# Patient Record
Sex: MALE | Race: WHITE
[De-identification: names, ages, dates, MRNs, and addresses within clinical notes are randomized per-mention and may not be internally consistent; named-entity substitution may affect disease eponyms.]

---

## 2018-10-21 ENCOUNTER — HOSPITAL ENCOUNTER (EMERGENCY)
Dept: HOSPITAL 33 - ED | Age: 28
Discharge: HOME | End: 2018-10-21
Payer: MEDICAID

## 2018-10-21 VITALS — OXYGEN SATURATION: 96 % | DIASTOLIC BLOOD PRESSURE: 84 MMHG | SYSTOLIC BLOOD PRESSURE: 132 MMHG | HEART RATE: 82 BPM

## 2018-10-21 DIAGNOSIS — Z72.0: ICD-10-CM

## 2018-10-21 DIAGNOSIS — K04.7: Primary | ICD-10-CM

## 2018-10-21 PROCEDURE — 99283 EMERGENCY DEPT VISIT LOW MDM: CPT

## 2018-10-21 RX ADMIN — CEPHALEXIN ONE MG: 500 CAPSULE ORAL at 05:02

## 2018-10-21 RX ADMIN — HYDROCODONE BITARTRATE AND ACETAMINOPHEN ONE TAB: 5; 325 TABLET ORAL at 04:53

## 2018-10-21 NOTE — ERPHSYRPT
- History of Present Illness


Time Seen by Provider: 10/21/18 04:14


Source: patient


Exam Limitations: no limitations


Patient Subjective Stated Complaint: pt co toothache and edema to left lower jaw

; pt states he cannot get into the dentist for 2 more weeks and this am woke up 

with severe pain and swelling in left jaw.


Triage Nursing Assessment: pt a&o x3; skin p, w, & d; ambulated to room per self

; acute distress noted; family in waiting room.


Physician History: 





pt has toothache now draining pus freely and tender left lower molar 

reproducible with percussion but no remaining fluctuance to drain; 


swallowing OK in ER. nontender anterior neck and soft floor of mouth and 

digastric triangles; 


Timing/Duration: gradual onset


Severity: moderate


ENT Location: dental


Prearrival Treatment: over the counter meds


Modifying Factors: Improves With: nothing


Associated Symptoms: facial pain/swelling


Allergies/Adverse Reactions: 








Penicillins Allergy (Mild, Verified 10/21/18 03:54)


 Hives





Hx Tetanus, Diphtheria Vaccination/Date Given: Yes


Hx Influenza Vaccination/Date Given: Yes (2018)


Hx Pneumococcal Vaccination/Date Given: No


Immunizations Up to Date: Yes





- Review of Systems


Constitutional: No Fever, No Chills


Eyes: No Symptoms


Ears, Nose, & Throat: Other (tender left lower molar)


Respiratory: No Cough, No Dyspnea


Cardiac: No Chest Pain, No Edema, No Syncope


Abdominal/Gastrointestinal: No Abdominal Pain, No Nausea, No Vomiting, No 

Diarrhea


Genitourinary Symptoms: No Dysuria


Musculoskeletal: No Back Pain, No Neck Pain


Skin: No Rash


Neurological: No Dizziness, No Focal Weakness, No Sensory Changes


Psychological: No Symptoms


Endocrine: No Symptoms


Hematologic/Lymphatic: No Symptoms


Immunological/Allergic: No Symptoms


All Other Systems: Reviewed and Negative





- Past Medical History


Pertinent Past Medical History: No


Neurological History: No Pertinent History


ENT History: No Pertinent History


Cardiac History: No Pertinent History


Respiratory History: No Pertinent History


Endocrine Medical History: No Pertinent History


Musculoskeletal History: No Pertinent History


GI Medical History: No Pertinent History


 History: No Pertinent History


Psycho-Social History: Bipolar


Male Reproductive Disorders: No Pertinent History





- Past Surgical History


Past Surgical History: No


Neuro Surgical History: No Pertinent History


Cardiac: No Pertinent History


Respiratory: No Pertinent History


Gastrointestinal: No Pertinent History


Genitourinary: No Pertinent History


Musculoskeletal: No Pertinent History


Male Surgical History: No Pertinent History





- Social History


Smoking Status: Current some day smoker


How long have you smoked: 5 years


Exposure to second hand smoke: Yes


Drug Use: none


Patient Lives Alone: No





- Nursing Vital Signs


Nursing Vital Signs: 





 Initial Vital Signs











Temperature  98.1 F   10/21/18 03:47


 


Pulse Rate  92 H  10/21/18 03:47


 


Respiratory Rate  16   10/21/18 03:47


 


Blood Pressure  138/84   10/21/18 03:47


 


O2 Sat by Pulse Oximetry  98   10/21/18 03:47








 Pain Scale











Pain Intensity                 10

















- Physical Exam


Eye Exam: bilateral eye: normal inspection, PERRL, EOMI


Ear Exam: bilateral ear: auricle normal


Nasal Exam: normal inspection


Throat Exam: pharynx normal, dental tenderness, No excessive drooling, No 

tongue swollen, No trismus, No uvula swelling, No voice changes


Neck Exam: normal inspection, non-tender, supple, full range of motion, trachea 

midline


Cardiovascular/Respiratory Exam: chest non-tender, normal breath sounds, heart 

sounds normal


Abdominal Exam: non-tender, soft


Neurologic Exam: alert, oriented x 3, cooperative, CNs II-XII nml as tested, 

normal mood/affect, nml cerebellar function, nml station & gait


Skin Exam: normal color, warm, dry


**SpO2 Interpretation**: normal


SpO2: 98


Oxygen Delivery: Room Air





- Course


Nursing assessment & vital signs reviewed: Yes





- Progress


Progress: improved


Progress Note: 





10/21/18 04:25


pt advised of risk/benefit of additional possible testing to exclude abcess 

spread and and he prefers to begin outpt tx and see dentist and return meantime 

if not improving


Counseled pt/family regarding: diagnosis, need for follow-up





- Departure


Time of Disposition: 04:26


Departure Disposition: Home


Clinical Impression: 


 dental apical abcess draining left lower





Condition: Good


Critical Care Time: No


Referrals: 


DOCTOR,NO FAMILY [Primary Care Provider] - 


Instructions:  Dental Pain (DC), Tooth Abscess (DC)


Additional Instructions: 


you have an abcess of the left lower molar  and need to see the dentist ASAP 

for definitive therapy; this is draining pus now and needs to be swished with 

oral peroxide , or mouthwash such as listerine 4 times a day and spit out;  

this abcess may spread so return if swelling increases, vomiting, or any 

trouble swallowing or breathing; 


Prescriptions: 


Cephalexin Mh 500 mg** [Keflex 500 mg**] 500 mg PO QID #60 capsule


Hydrocodone/Acetaminophen [Norco 5-325 Tablet] 1 each PO Q4-6HPRN PRN 7 Days #

14 tablet MDD 4


 PRN Reason: Pain

## 2021-01-25 ENCOUNTER — HOSPITAL ENCOUNTER (EMERGENCY)
Dept: HOSPITAL 33 - ED | Age: 31
Discharge: HOME | End: 2021-01-25
Payer: MEDICAID

## 2021-01-25 VITALS — OXYGEN SATURATION: 99 %

## 2021-01-25 VITALS — DIASTOLIC BLOOD PRESSURE: 66 MMHG | HEART RATE: 72 BPM | SYSTOLIC BLOOD PRESSURE: 124 MMHG

## 2021-01-25 DIAGNOSIS — S91.311A: Primary | ICD-10-CM
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
## 2021-01-25 PROCEDURE — 96372 THER/PROPH/DIAG INJ SC/IM: CPT

## 2021-01-25 PROCEDURE — 99284 EMERGENCY DEPT VISIT MOD MDM: CPT

## 2021-01-25 PROCEDURE — 12001 RPR S/N/AX/GEN/TRNK 2.5CM/<: CPT

## 2021-01-25 RX ADMIN — BACITRACIN ZINC ONE GM: 500 OINTMENT TOPICAL at 21:07

## 2021-01-25 RX ADMIN — CLINDAMYCIN PHOSPHATE STA MG: 150 INJECTION, SOLUTION INTRAVENOUS at 20:49

## 2021-01-25 NOTE — ERPHSYRPT
- History of Present Illness


Time Seen by Provider: 01/25/21 20:30


Source: patient


Exam Limitations: no limitations


Patient Subjective Stated Complaint: Patient states " I was walking around 

barefoot at home and stepped on glass and sliced the bottom of my right foot".


Triage Nursing Assessment: Patient arrived to ED and ambulated to room without 

difficulty. Patient A/O times 4. Patient able to follow instructions without 

difficulty. Patient noted with cut on bottom of right foot that measures 1CM and

irregular. No active bleeding upon arrival to ED. No odor present. No S/S of 

infection noted. No swelling noted. Patient stated he pulled all the glass out. 

Patient did state that it bleed large amounts after pulling out glass. + pedal 

pulses noted. Patient ROM to right foot WNL. Patient able to feel sensation and 

wiggle toes without difficulty.


Physician History: 





Patient is a 30-year-old male who presents to our ED for evaluation of 

laceration to the right heel.  Patient was at home.  Patient stepped on a 1cm 

piece of glass.  Injury occurred just prior to arrival.  Patient pulled the 

glass out of his foot.  Patient denies residual foreign body sensation.  Tetanus

up-to-date.  Pain described as an ache that is well localized.  No radiation.  

Pain worse with palpation.  Pain improved with rest.  No other injuries 

reported.  Patient voices no other complaints concerns at this time.


Timing/Duration: today


Severity: mild


Modifying Factors: Improves With: medication


Associated Symptoms: denies symptoms


Allergies/Adverse Reactions: 








Penicillins Allergy (Mild, Verified 01/25/21 20:29)


   Hives





Hx Tetanus, Diphtheria Vaccination/Date Given: Yes


Hx Influenza Vaccination/Date Given: No


Hx Pneumococcal Vaccination/Date Given: No


Immunizations Up to Date: Yes





Travel Risk





- International Travel


Have you traveled outside of the country in past 3 weeks: No





- Coronavirus Screening


Are you exhibiting any of the following symptoms?: No


Close contact with a COVID-19 positive Pt in past 14-21 Days: No





- Review of Systems


Constitutional: No Symptoms, No Fever, No Chills


Eyes: No Symptoms


Ears, Nose, & Throat: No Symptoms


Respiratory: No Symptoms, No Cough, No Dyspnea


Cardiac: No Symptoms, No Chest Pain, No Edema, No Syncope


Abdominal/Gastrointestinal: No Symptoms, No Abdominal Pain, No Nausea, No 

Vomiting, No Diarrhea


Genitourinary Symptoms: No Symptoms, No Dysuria


Musculoskeletal: No Symptoms, No Back Pain, No Neck Pain


Skin: No Symptoms, No Rash


Neurological: No Symptoms, No Dizziness, No Focal Weakness, No Sensory Changes


Psychological: No Symptoms


Endocrine: No Symptoms


Hematologic/Lymphatic: No Symptoms


Immunological/Allergic: No Symptoms


All Other Systems: Reviewed and Negative





- Past Medical History


Pertinent Past Medical History: No


Neurological History: No Pertinent History


ENT History: No Pertinent History


Cardiac History: No Pertinent History


Respiratory History: No Pertinent History


Endocrine Medical History: No Pertinent History


Musculoskeletal History: No Pertinent History


GI Medical History: No Pertinent History


 History: No Pertinent History


Psycho-Social History: Bipolar


Male Reproductive Disorders: No Pertinent History





- Past Surgical History


Past Surgical History: No


Neuro Surgical History: No Pertinent History


Cardiac: No Pertinent History


Respiratory: No Pertinent History


Gastrointestinal: No Pertinent History


Genitourinary: No Pertinent History


Musculoskeletal: No Pertinent History


Male Surgical History: No Pertinent History





- Social History


Smoking Status: Former smoker


How long have you smoked: 5 years


Exposure to second hand smoke: Yes


Drug Use: none


Patient Lives Alone: No





- Nursing Vital Signs


Nursing Vital Signs: 


                               Initial Vital Signs











Temperature  97.9 F   01/25/21 20:26


 


Pulse Rate  84   01/25/21 20:26


 


Respiratory Rate  20   01/25/21 20:26


 


Blood Pressure  157/82   01/25/21 20:26


 


O2 Sat by Pulse Oximetry  99   01/25/21 20:26








                                   Pain Scale











Pain Intensity                 8

















- Physical Exam


General Appearance: no apparent distress, alert


Eye Exam: PERRL/EOMI, eyes nml inspection


Ears, Nose, Throat Exam: normal ENT inspection, TMs normal, pharynx normal, 

moist mucous membranes


Neck Exam: normal inspection, non-tender, supple, full range of motion


Respiratory Exam: normal breath sounds, lungs clear, No respiratory distress


Cardiovascular Exam: regular rate/rhythm, normal heart sounds, normal peripheral

pulses


Gastrointestinal/Abdomen Exam: soft, normal bowel sounds, No tenderness, No mass


Back Exam: normal inspection, normal range of motion, No CVA tenderness, No 

vertebral tenderness


Extremity Exam: normal inspection, normal range of motion, pelvis stable


Neurologic Exam: alert, oriented x 3, cooperative, normal mood/affect, nml 

cerebellar function, sensation nml, No motor deficits


Skin Exam: normal color, warm, dry, No rash


Lymphatic Exam: No adenopathy


**SpO2 Interpretation**: normal


SpO2: 99


O2 Delivery: Room Air





Procedures





- Laceration/Wound Repair


  ** Foot


Wound Location: Right (Plantar surface of right heel.)


Wound Length (cm): 1


Wound's Depth, Shape: irregular, into subcut


Wound Explored: no foreign body noted


Irrigated: Yes


Hibiclens Prep: Yes


Anesthesia: 1% Lidocaine


Wound Debrided: No debridement required


Wound Repaired With: sutures (2 sutures placed just to keep the wound edges 

approximated.  The closure was loose.)


Suture Size/Type: 5-0, nylon


Number of Sutures: 2


Layer Closure?: No


Sterile Dressing Applied?: Yes


Splint Applied?: No


Sling Applied?: No





- Course


Nursing assessment & vital signs reviewed: Yes


Ordered Tests: 


Medication Summary














Discontinued Medications














Generic Name Dose Route Start Last Admin





  Trade Name Freq  PRN Reason Stop Dose Admin


 


Clindamycin Phosphate  600 mg  01/25/21 20:39  01/25/21 20:49





  Cleocin Phosphate Iv 600 Mg/4 Ml***  IM  01/25/21 20:40  600 mg





  ONCE STA   Administration


 


Clindamycin Phosphate  Confirm  01/25/21 20:47 





  Cleocin Phosphate Iv 600 Mg/4 Ml***  Administered  01/25/21 20:48 





  Dose  





  600 mg  





  .ROUTE  





  .STK-MED ONE  


 


Lidocaine HCl  Confirm  01/25/21 20:44 





  Xylocaine 1% Hcl 20 Ml Mdv***  Administered  01/25/21 20:45 





  Dose  





  3 ml  





  .ROUTE  





  .STK-MED ONE  














- Progress


Progress: improved


Progress Note: 





01/25/21 21:06


Patient reassessed.  Pain improved.  Patient received a dose of clindamycin IM. 

Patient is allergic to sulfa and penicillin.  Tetanus up-to-date.  2 simple 

interrupted sutures placed just to maintain wound edge approximation.  The wound

is not sutured close.  Work note provided.  Patient agrees to follow-up with his

primary care doctor within 48 hours for reevaluation.  Bilateral axillary 

crutches provided.


01/25/21 21:08





Counseled pt/family regarding: diagnosis, need for follow-up





- Departure


Departure Disposition: Home


Clinical Impression: 


 Foot laceration





Condition: Stable


Critical Care Time: No


Referrals: 


DOCTOR,NO FAMILY [Primary Care Provider] - 


JIMENA ROMAN,  [ACTIVE STAFF] - 


Additional Instructions: 


Discharge/Care Plan





LOVERAMON NICHOLAS was seen on 01/25/21 in the Emergency Room. The patient was 

counseled regarding Diagnosis,Lab results, Imaging studies, need for follow up 

and when to return to the Emergency Room.





Prescriptions given:





Discharge Note





I have spoken with the patient and/or caregivers. I have explained the patient's

condition, diagnosis and treatment plan based on the information available to me

at this time. I have answered the patient's and/or caregiver's questions and 

addressed any concerns. The patient and/or caregivers have as good understanding

of the patient's diagnosis, condition and treatment plan as can be expected at 

this point. The vital signs have been stable. The patient's condition is stable 

and appropriate for discharge from the emergency department.





The patient will pursue further outpatient evaluation with the primary care 

physician or other designated or consulting physician as outlined in the 

discharge instructions. The patient and/or caregivers are agreeable to this plan

of care and follow-up instructions have been explained in detail. The patient 

and/or caregivers have received these instruction. The patient/and or caregivers

are aware that any significant change in condition or worsening of symptoms 

should prompt an immediate return to this or the closest emergency department or

call 911. 








Forms:  Work/School Release Form


Prescriptions: 


Clindamycin HCl 150 mg*** [Cleocin 150 mg Capsule***] 2 cap PO QID 7 Days #56 

capsule

## 2022-01-04 NOTE — ERPHSYRPT
- History of Present Illness


Time Seen by Provider: 01/04/22 03:00


Source: patient


Exam Limitations: no limitations


Patient Subjective Stated Complaint: Patient c/o right sided facial swelling 

that started approx 24 hours ago. Patient denies trouble swallowing or breathing

. States right side of mouth/face does hurt.


Triage Nursing Assessment: Patient ambulated back to ED without difficulties. He

is alert and oriented and answering questions appropriately. Right side of face 

swollen. Patient with a noted cavity in right side of mouth and also has a tooth

towards the back right side of his mouth that doesn't have an obvious cavity on 

exam but gum around that tooth is inflammed/swollen. No SOB noted. Right side of

neck also slightly swollen in lymph node area.


Physician History: 





This is a 31-year-old white male who is allergic to penicillin and does not know

if he is allergic to Keflex or not and presents with chronic poor dentition.  He

became concerned because in the last 24 hours is been increased swelling of his 

right cheek.  The tenderness has also increased as well.  His right upper molars

are painful


Timing/Duration: gradual onset


Severity: mild, moderate


ENT Location: facial


Prearrival Treatment: over the counter meds


Associated Symptoms: facial pain/swelling (Right side), tooth pain (Right upper 

molars)


Allergies/Adverse Reactions: 








Penicillins Allergy (Mild, Verified 01/04/22 02:57)


   Hives





Hx Tetanus, Diphtheria Vaccination/Date Given: Yes


Hx Influenza Vaccination/Date Given: No


Hx Pneumococcal Vaccination/Date Given: No


Immunizations Up to Date: Yes





Travel Risk





- International Travel


Have you traveled outside of the country in past 3 weeks: No





- Coronavirus Screening


Are you exhibiting any of the following symptoms?: No


Close contact with a COVID-19 positive Pt in past 14-21 Days: No





- Vaccine Status


Have you recieved a Covid-19 vaccination: No





- Review of Systems


Constitutional: No Symptoms


Eyes: No Symptoms


Ears, Nose, & Throat: Other (Dental pain right upper molars)


Respiratory: No Symptoms


Cardiac: No Symptoms


Abdominal/Gastrointestinal: No Symptoms


Genitourinary Symptoms: No Symptoms


Musculoskeletal: No Symptoms


Skin: No Symptoms


Neurological: No Symptoms


Psychological: No Symptoms


Endocrine: No Symptoms


Hematologic/Lymphatic: No Symptoms


Immunological/Allergic: No Symptoms


All Other Systems: Reviewed and Negative





- Past Medical History


Pertinent Past Medical History: No


Neurological History: No Pertinent History


ENT History: No Pertinent History


Cardiac History: No Pertinent History


Respiratory History: No Pertinent History


Endocrine Medical History: No Pertinent History


Musculoskeletal History: No Pertinent History


GI Medical History: No Pertinent History


 History: No Pertinent History


Psycho-Social History: Bipolar


Male Reproductive Disorders: No Pertinent History





- Past Surgical History


Past Surgical History: No


Neuro Surgical History: No Pertinent History


Cardiac: No Pertinent History


Respiratory: No Pertinent History


Gastrointestinal: No Pertinent History


Genitourinary: No Pertinent History


Musculoskeletal: No Pertinent History


Male Surgical History: No Pertinent History





- Social History


Smoking Status: Former smoker


How long have you smoked: 5 years


Exposure to second hand smoke: No


Drug Use: none


Patient Lives Alone: No





- Nursing Vital Signs


Nursing Vital Signs: 





                               Initial Vital Signs











Temperature  97.8 F   01/04/22 02:46


 


Pulse Rate  81   01/04/22 02:46


 


Respiratory Rate  20   01/04/22 02:46


 


Blood Pressure  172/120   01/04/22 02:46


 


O2 Sat by Pulse Oximetry  99   01/04/22 02:46








                                   Pain Scale











Pain Intensity                 8

















- Physical Exam


General Appearance: no apparent distress, alert


Eye Exam: bilateral eye: normal inspection, PERRL, EOMI


Ear Exam: bilateral ear: auricle normal


Nasal Exam: normal inspection


Throat Exam: dental tenderness (Generalized dental caries and poor nutrition.  

There is gingivitis present in the right upper molar region.  There is foul odor

as well.  No obvious dental abscess), maxillary swelling (Mild right side), 

moist mucus membranes, No voice changes


Neck Exam: normal inspection, non-tender, supple, full range of motion, trachea 

midline


Cardiovascular/Respiratory Exam: chest non-tender, normal breath sounds, heart 

sounds normal, no respiratory distress, No crepitus


Abdominal Exam: non-tender


Neurologic Exam: alert, oriented x 3, cooperative, CNs II-XII nml as tested, 

normal mood/affect, nml cerebellar function, nml station & gait, sensation nml


Skin Exam: normal color, warm, dry


**SpO2 Interpretation**: normal


SpO2: 99


O2 Delivery: Room Air





- Course


Nursing assessment & vital signs reviewed: Yes





- Progress


Progress: unchanged


Counseled pt/family regarding: diagnosis, need for follow-up





- Departure


Departure Disposition: Home


Clinical Impression: 


 Dental infection





Condition: Stable


Critical Care Time: No


Referrals: 


JOSEPH OLSON NP [Primary Care Provider] - Follow up/PCP as directed


Additional Instructions: 


Use Tylenol and ibuprofen for pain control.  Take your medications as 

prescribed.  Follow-up with a dentist later today by phone to make arrangements 

for dental intervention.


Prescriptions: 


Clindamycin HCl 150 mg*** [Cleocin 150 mg Capsule***] 2 cap PO QID #56 cap